# Patient Record
Sex: MALE | Race: OTHER | HISPANIC OR LATINO | Employment: OTHER | URBAN - METROPOLITAN AREA
[De-identification: names, ages, dates, MRNs, and addresses within clinical notes are randomized per-mention and may not be internally consistent; named-entity substitution may affect disease eponyms.]

---

## 2022-05-16 ENCOUNTER — OFFICE VISIT (OUTPATIENT)
Dept: UROLOGY | Facility: CLINIC | Age: 62
End: 2022-05-16
Payer: OTHER GOVERNMENT

## 2022-05-16 VITALS
HEART RATE: 82 BPM | DIASTOLIC BLOOD PRESSURE: 89 MMHG | WEIGHT: 204 LBS | TEMPERATURE: 98 F | BODY MASS INDEX: 29.2 KG/M2 | RESPIRATION RATE: 24 BRPM | HEIGHT: 70 IN | SYSTOLIC BLOOD PRESSURE: 129 MMHG

## 2022-05-16 DIAGNOSIS — R39.9 LOWER URINARY TRACT SYMPTOMS (LUTS): ICD-10-CM

## 2022-05-16 DIAGNOSIS — C61 PROSTATE CANCER: Primary | ICD-10-CM

## 2022-05-16 DIAGNOSIS — N52.9 ERECTILE DYSFUNCTION, UNSPECIFIED ERECTILE DYSFUNCTION TYPE: ICD-10-CM

## 2022-05-16 LAB
POC RESIDUAL URINE VOLUME: 0 ML (ref 0–100)
PSA, DIAGNOSTIC: 0.25 NG/ML (ref 0–4)

## 2022-05-16 PROCEDURE — 51798 POCT BLADDER SCAN: ICD-10-PCS | Mod: S$GLB,,, | Performed by: NURSE PRACTITIONER

## 2022-05-16 PROCEDURE — 99204 OFFICE O/P NEW MOD 45 MIN: CPT | Mod: S$GLB,,, | Performed by: NURSE PRACTITIONER

## 2022-05-16 PROCEDURE — 99204 PR OFFICE/OUTPT VISIT, NEW, LEVL IV, 45-59 MIN: ICD-10-PCS | Mod: S$GLB,,, | Performed by: NURSE PRACTITIONER

## 2022-05-16 PROCEDURE — 51798 US URINE CAPACITY MEASURE: CPT | Mod: S$GLB,,, | Performed by: NURSE PRACTITIONER

## 2022-05-16 RX ORDER — IPRATROPIUM BROMIDE AND ALBUTEROL SULFATE 2.5; .5 MG/3ML; MG/3ML
3 SOLUTION RESPIRATORY (INHALATION) EVERY 6 HOURS PRN
COMMUNITY

## 2022-05-16 RX ORDER — TRAZODONE HYDROCHLORIDE 150 MG/1
150 TABLET ORAL NIGHTLY
COMMUNITY

## 2022-05-16 RX ORDER — TAMSULOSIN HYDROCHLORIDE 0.4 MG/1
CAPSULE ORAL DAILY
COMMUNITY

## 2022-05-16 RX ORDER — AMLODIPINE BESYLATE 5 MG/1
5 TABLET ORAL DAILY
COMMUNITY

## 2022-05-16 RX ORDER — HYDROCHLOROTHIAZIDE 25 MG/1
25 TABLET ORAL DAILY
COMMUNITY

## 2022-05-16 RX ORDER — RISPERIDONE 4 MG/1
4 TABLET ORAL NIGHTLY
COMMUNITY

## 2022-05-16 RX ORDER — HYDROXYZINE HYDROCHLORIDE 50 MG/1
50 TABLET, FILM COATED ORAL 4 TIMES DAILY
COMMUNITY

## 2022-05-16 RX ORDER — OMEPRAZOLE 20 MG/1
20 CAPSULE, DELAYED RELEASE ORAL DAILY
COMMUNITY

## 2022-05-16 RX ORDER — OXYBUTYNIN CHLORIDE 15 MG/1
15 TABLET, EXTENDED RELEASE ORAL DAILY
Qty: 30 TABLET | Refills: 11 | Status: SHIPPED | OUTPATIENT
Start: 2022-05-16 | End: 2023-03-16 | Stop reason: SDUPTHER

## 2022-05-16 NOTE — PROGRESS NOTES
Subjective:       Patient ID: Mati Kang is a 61 y.o. male.    Chief Complaint: Dysuria, Urinary Frequency, Erectile Dysfunction, Urinary Urgency, and incontinence      HPI: 61-year-old male, new to [facility]  Patient has a history of prostate cancer.  Patient has had a prostatectomy.  Patient states this happened a long time ago.  He states too long ago to remember .  PSA from 10/21/2021 noted on his referral.  PSA at that time was 0.23.    Patient has a history of erectile dysfunction.  Patient complains of inability to get erections.  He has been on generic Viagra which is no longer working.    Patient is complaining of some urinary frequency.  Patient feels like he has to void, however he is unable to produce any urine or only produces a small amount of urine.  States this happens throughout the day and at night.     Patient is currently on Flomax 0.4 mg daily.    He denies any pain or burning urination.  Denies any odor urine.  Denies any fever.  Denies body aches.  Denies blood in urine.  Denies any significant weight loss.  Denies any new onset bone pain.  No other urinary complaints at this time.       Past Medical History:   Past Medical History:   Diagnosis Date    Allergy     Anxiety disorder, unspecified     Asthma     Bipolar disorder, unspecified     COPD (chronic obstructive pulmonary disease)     Hypertension     Mood changes     Prostate cancer        Past Surgical Historical:   Past Surgical History:   Procedure Laterality Date    HERNIA REPAIR      PROSTATE SURGERY          Medications:   Medication List with Changes/Refills   New Medications    OXYBUTYNIN (DITROPAN XL) 15 MG TR24    Take 1 tablet (15 mg total) by mouth once daily.   Current Medications    ALBUTEROL SULFATE 90 MCG/ACTUATION AEBS    Inhale 180 mcg into the lungs every 4 (four) hours.    ALBUTEROL-IPRATROPIUM (DUO-NEB) 2.5 MG-0.5 MG/3 ML NEBULIZER SOLUTION    Take 3 mLs by nebulization  every 6 (six) hours as needed for Wheezing. Rescue    AMLODIPINE (NORVASC) 5 MG TABLET    Take 5 mg by mouth once daily.    HYDROCHLOROTHIAZIDE (HYDRODIURIL) 25 MG TABLET    Take 25 mg by mouth once daily.    HYDROXYZINE (ATARAX) 50 MG TABLET    Take 50 mg by mouth 4 (four) times daily.    IPRATROPIUM-ALBUTEROL (COMBIVENT)  MCG/ACTUATION INHALER    Inhale 1 puff into the lungs 4 (four) times daily. Rescue    OMEPRAZOLE (PRILOSEC) 20 MG CAPSULE    Take 20 mg by mouth once daily.    RISPERIDONE (RISPERDAL) 4 MG TABLET    Take 4 mg by mouth nightly.    TAMSULOSIN (FLOMAX) 0.4 MG CAP    Take by mouth once daily.    TRAZODONE (DESYREL) 150 MG TABLET    Take 150 mg by mouth every evening.        Past Social History:   Social History     Socioeconomic History    Marital status: Unknown   Tobacco Use    Smoking status: Current Some Day Smoker     Types: Cigarettes    Smokeless tobacco: Never Used   Substance and Sexual Activity    Alcohol use: Not Currently    Drug use: Not Currently    Sexual activity: Not Currently     Partners: Female       Allergies: Review of patient's allergies indicates:  No Known Allergies     Family History: History reviewed. No pertinent family history.     Review of Systems:  Review of Systems   Constitutional: Negative for activity change and appetite change.   HENT: Negative for congestion and dental problem.    Eyes: Negative for visual disturbance.   Respiratory: Negative for chest tightness and shortness of breath.    Cardiovascular: Negative for chest pain.   Gastrointestinal: Negative for abdominal distention and abdominal pain.   Genitourinary: Positive for difficulty urinating and frequency. Negative for decreased urine volume, dysuria, enuresis, flank pain, genital sores, hematuria, penile discharge, penile pain, penile swelling, scrotal swelling, testicular pain and urgency.   Musculoskeletal: Negative for back pain and neck pain.   Skin: Negative for color change.    Neurological: Negative for dizziness.   Hematological: Negative for adenopathy.   Psychiatric/Behavioral: Negative for agitation, behavioral problems and confusion.       Physical Exam:  Physical Exam  Vitals and nursing note reviewed.   Constitutional:       Appearance: He is well-developed.   HENT:      Head: Normocephalic.   Eyes:      Pupils: Pupils are equal, round, and reactive to light.   Cardiovascular:      Rate and Rhythm: Normal rate and regular rhythm.      Heart sounds: Normal heart sounds.   Pulmonary:      Effort: Pulmonary effort is normal.      Breath sounds: Normal breath sounds.   Abdominal:      General: Bowel sounds are normal.      Palpations: Abdomen is soft.   Musculoskeletal:         General: Normal range of motion.      Cervical back: Normal range of motion and neck supple.   Skin:     General: Skin is warm and dry.   Neurological:      Mental Status: He is alert and oriented to person, place, and time.   Psychiatric:         Behavior: Behavior normal.       Bladder scan:  0 cc  Urinalysis:  Trace ketones, protein 30, otherwise normal.    Assessment/Plan:   1. Prostate cancer:  Check the patient's PSA.  We will notify him of the results.  Last PSA on record was on 10/21/2021 and was 0.23.    2. Erectile dysfunction:  Patient has failed Viagra.  Did discuss Cialis.  Patient interested in Cialis.  Patient states he would like to proceed with surgical penile prosthesis.  Will need to refer out.    3. Lower urinary tract symptoms:  Patient continue Flomax as directed.  Will start patient on oxybutynin XL 15 mg daily.  Will re-evaluate at next visit.    Plan follow-up in 2-3 months for re-evaluation, sooner if needed    Problem List Items Addressed This Visit    None     Visit Diagnoses     Prostate cancer    -  Primary    Relevant Orders    Prostate Specific Antigen, Diagnostic    Lower urinary tract symptoms (LUTS)        Relevant Medications    oxybutynin (DITROPAN XL) 15 MG TR24    Other  Relevant Orders    POCT Bladder Scan    Erectile dysfunction, unspecified erectile dysfunction type        Relevant Orders    Ambulatory referral/consult to Urology

## 2022-05-17 ENCOUNTER — TELEPHONE (OUTPATIENT)
Dept: UROLOGY | Facility: CLINIC | Age: 62
End: 2022-05-17
Payer: OTHER GOVERNMENT

## 2022-05-17 NOTE — TELEPHONE ENCOUNTER
Patient notified of PSA results and inquired about wanting surgery for Penile Protheisis. Explained that will [facility] and [facility]. MC LPN    ----- Message from Jose Mathis NP sent at 5/17/2022 10:14 AM CDT -----  PSA is 0.252.  [facility] was 0.[facility].    Will recheck at next visit.

## 2022-05-17 NOTE — TELEPHONE ENCOUNTER
VENU faxed to [facility] for referral authorization to Dr. Andrey Wolf.    Patient notified, stated he would like to see a urologist in [place]- patient advised to communicate that with the [facility] provider to see if another urologist in [place] accepts VA coverage/will perform procedure.

## 2022-06-03 ENCOUNTER — TELEPHONE (OUTPATIENT)
Dept: UROLOGY | Facility: CLINIC | Age: 62
End: 2022-06-03
Payer: OTHER GOVERNMENT

## 2022-06-03 NOTE — TELEPHONE ENCOUNTER
----- Message from Giuliana Moreno MA sent at 6/3/2022  2:32 PM CDT -----  Contact: Patient  Pt is wanting procedure done. Looks like tasha said to refer him out.  ----- Message -----  From: Yelena Brar  Sent: 6/3/2022   2:26 PM CDT  To: Darin Cunningham Staff    Patient need the nurse to call him. He need to schedule his procedure.  He has gotten [facility] to [facility] in [place]        Call back #  [phone]

## 2022-06-03 NOTE — TELEPHONE ENCOUNTER
Spoke with pt about his [facility] approval for his surgery. Pt states it was approved to be done here not [place]. I tried to explain to pt I do not know if other urologist in [place] will do this sx. Pt states he will just contact [facility] to find him another urologist.

## 2022-06-17 ENCOUNTER — TELEPHONE (OUTPATIENT)
Dept: UROLOGY | Facility: CLINIC | Age: 62
End: 2022-06-17
Payer: OTHER GOVERNMENT

## 2022-06-17 DIAGNOSIS — N52.9 ERECTILE DYSFUNCTION, UNSPECIFIED ERECTILE DYSFUNCTION TYPE: Primary | ICD-10-CM

## 2022-06-17 RX ORDER — SILDENAFIL 100 MG/1
100 TABLET, FILM COATED ORAL DAILY PRN
Qty: 5 TABLET | Refills: 5 | Status: SHIPPED | OUTPATIENT
Start: 2022-06-17 | End: 2022-11-21 | Stop reason: SDUPTHER

## 2022-06-17 NOTE — TELEPHONE ENCOUNTER
Per note from visit, Viagra did not work for the patient.  We did discuss Cialis.  However, patient wanted to proceed with a penile prosthesis.  He was to be referred out very penile prosthesis.

## 2022-06-17 NOTE — TELEPHONE ENCOUNTER
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 He stated he called the [facility] and they stated [facility] hasn't assigned him a new Urologist yet. Please advise. MC LPN

## 2022-06-17 NOTE — TELEPHONE ENCOUNTER
----- Message from Olivia Palmer sent at 6/17/2022  8:41 AM CDT -----  Contact: Patient  Type:  RX Refill Request    Who Called: Mati Kang  Refill or New Rx:refill  RX Name and Strength:Viagra(didn't state the mg)  How is the patient currently taking it? (ex. 1XDay):as needed  Is this a 30 day or 90 day RX:30  Preferred Pharmacy with phone number:  [facility] - [address] [address]  Phone: [phone] Fax: [phone]  Local or Mail Order:local  Ordering Provider:Jose Mathis  Would the patient rather a call back or a response via [facility]? Call back   Best Call Back Number:[phone]  Additional Information:

## 2022-08-16 ENCOUNTER — CLINICAL SUPPORT (OUTPATIENT)
Dept: UROLOGY | Facility: CLINIC | Age: 62
End: 2022-08-16
Payer: OTHER GOVERNMENT

## 2022-08-16 VITALS
HEART RATE: 73 BPM | BODY MASS INDEX: 30.21 KG/M2 | DIASTOLIC BLOOD PRESSURE: 94 MMHG | WEIGHT: 204 LBS | SYSTOLIC BLOOD PRESSURE: 166 MMHG | HEIGHT: 69 IN

## 2022-08-16 DIAGNOSIS — R39.9 LOWER URINARY TRACT SYMPTOMS (LUTS): ICD-10-CM

## 2022-08-16 DIAGNOSIS — C61 PROSTATE CANCER: Primary | ICD-10-CM

## 2022-08-16 LAB — POC RESIDUAL URINE VOLUME: 0 ML (ref 0–100)

## 2022-08-16 PROCEDURE — 99499 POCT BLADDER SCAN: ICD-10-PCS | Mod: S$GLB,,, | Performed by: NURSE PRACTITIONER

## 2022-08-16 PROCEDURE — 99499 UNLISTED E&M SERVICE: CPT | Mod: S$GLB,,, | Performed by: NURSE PRACTITIONER

## 2022-11-21 ENCOUNTER — TELEPHONE (OUTPATIENT)
Dept: UROLOGY | Facility: CLINIC | Age: 62
End: 2022-11-21
Payer: OTHER GOVERNMENT

## 2022-11-21 DIAGNOSIS — N52.9 ERECTILE DYSFUNCTION, UNSPECIFIED ERECTILE DYSFUNCTION TYPE: ICD-10-CM

## 2022-11-21 RX ORDER — SILDENAFIL 100 MG/1
100 TABLET, FILM COATED ORAL DAILY PRN
Qty: 5 TABLET | Refills: 0 | Status: SHIPPED | OUTPATIENT
Start: 2022-11-21 | End: 2023-03-16 | Stop reason: SDUPTHER

## 2022-11-21 NOTE — TELEPHONE ENCOUNTER
Spoke with pt about medication and apts. Pt stated he just needs his viagra filled and he will then make another apt once he receives medication. Notified pt a message would have to be sent to provider to be approved before medication gets sent out. ED

## 2022-11-21 NOTE — TELEPHONE ENCOUNTER
Notified pt that medication was sent out and that he needed to make a apt in order to get more refills. Pt verbalized understanding. ED

## 2022-11-21 NOTE — TELEPHONE ENCOUNTER
----- Message from Kisha Whaley LPN sent at 11/21/2022  2:33 PM CST -----  Contact: pt    ----- Message -----  From: Mayra Arevalo  Sent: 11/21/2022   2:02 PM CST  To: Del Garcia Staff    Pt calling to check status of script for sildenafiL (VIAGRA) 100 MG and status of surgery.  Pt can be reached at [phone]    Thanks,

## 2022-11-21 NOTE — TELEPHONE ENCOUNTER
----- Message from Katlin Irving sent at 11/21/2022  8:55 AM CST -----  Contact: pt  .Type:  RX Refill Request    Who Called: selena  Refill or New Rx:refill  RX Name and Strength:sildenafiL (VIAGRA) 100 MG tablet  How is the patient currently taking it? (ex. 1XDay):  Is this a 30 day or 90 day RX:  Preferred Pharmacy with phone number:[facility] - [address] [address]  Phone: [phone] Fax: [phone]      Local or Mail Order:local  Ordering Provider:amado  Would the patient rather a call back or a response via [facility]? rosa  Best Call Back Number:.[phone]    Additional Information: pt also waiting on information on surgery

## 2023-01-31 ENCOUNTER — TELEPHONE (OUTPATIENT)
Dept: UROLOGY | Facility: CLINIC | Age: 63
End: 2023-01-31

## 2023-01-31 NOTE — TELEPHONE ENCOUNTER
----- Message from Olivia Palmer sent at 1/31/2023  1:13 PM CST -----  Contact: Patient  Patient called to consult with nurse or staff regarding his appointment on today. He would like to know if he can come in earlier if possible. If not, than he will be at the appointment at 4. He can be reached at [phone]. Thanks/

## 2023-03-16 ENCOUNTER — OFFICE VISIT (OUTPATIENT)
Dept: UROLOGY | Facility: CLINIC | Age: 63
End: 2023-03-16
Payer: OTHER GOVERNMENT

## 2023-03-16 VITALS — WEIGHT: 210 LBS | BODY MASS INDEX: 31.1 KG/M2 | HEIGHT: 69 IN

## 2023-03-16 DIAGNOSIS — R39.9 LOWER URINARY TRACT SYMPTOMS (LUTS): ICD-10-CM

## 2023-03-16 DIAGNOSIS — N52.9 ERECTILE DYSFUNCTION, UNSPECIFIED ERECTILE DYSFUNCTION TYPE: ICD-10-CM

## 2023-03-16 DIAGNOSIS — C61 PROSTATE CANCER: Primary | ICD-10-CM

## 2023-03-16 PROCEDURE — 99214 OFFICE O/P EST MOD 30 MIN: CPT | Mod: S$GLB,,, | Performed by: NURSE PRACTITIONER

## 2023-03-16 PROCEDURE — 99214 PR OFFICE/OUTPT VISIT, EST, LEVL IV, 30-39 MIN: ICD-10-PCS | Mod: S$GLB,,, | Performed by: NURSE PRACTITIONER

## 2023-03-16 RX ORDER — SILDENAFIL 100 MG/1
100 TABLET, FILM COATED ORAL DAILY PRN
Qty: 21 TABLET | Refills: 3 | Status: SHIPPED | OUTPATIENT
Start: 2023-03-16 | End: 2024-03-15

## 2023-03-16 RX ORDER — LATANOPROST 50 UG/ML
SOLUTION/ DROPS OPHTHALMIC
COMMUNITY
Start: 2023-03-07

## 2023-03-16 RX ORDER — IPRATROPIUM BROMIDE 0.5 MG/2.5ML
SOLUTION RESPIRATORY (INHALATION)
COMMUNITY
Start: 2023-02-27

## 2023-03-16 RX ORDER — CARBAMAZEPINE 400 MG/1
800 TABLET, EXTENDED RELEASE ORAL
COMMUNITY
Start: 2022-07-29

## 2023-03-16 RX ORDER — CLONIDINE HYDROCHLORIDE 0.3 MG/1
0.3 TABLET ORAL
COMMUNITY
Start: 2022-12-06

## 2023-03-16 RX ORDER — OXYBUTYNIN CHLORIDE 15 MG/1
15 TABLET, EXTENDED RELEASE ORAL DAILY
Qty: 30 TABLET | Refills: 11 | Status: SHIPPED | OUTPATIENT
Start: 2023-03-16 | End: 2024-03-15

## 2023-03-16 RX ORDER — LANOLIN ALCOHOL/MO/W.PET/CERES
1000 CREAM (GRAM) TOPICAL
COMMUNITY
Start: 2022-12-06

## 2023-03-16 NOTE — PROGRESS NOTES
Subjective:       Patient ID: Mati Kang is a 62 y.o. male.    Chief Complaint: Prostate Cancer      HPI: 62-year-old male, established patient, last seen May 2022.    Patient has history of prostate cancer.  He would a radical prostatectomy.    Patient [facility] in [place] [facility]  He does not recall when.      Patient is on oxybutynin XL 50 mg daily for frequency and urgency.    Patient states he has been taking inconsistently lately.  But he is requesting refill.      Patient has history of erectile dysfunction.  Maintained on sildenafil 100 mg as needed.    Patient states working well no complaints or complications.  Request refill.      Denies any pain or burning urination.  Denies any odor urine.  Denies any fever body aches.  Denies any blood in urine.  Denies any significant weight loss.  Denies any onset bone pain.    No other urinary complaints at this time.       Past Medical History:   Past Medical History:   Diagnosis Date    ADD (attention deficit disorder)     Allergy     Anxiety disorder, unspecified     Asthma     Bi inguinal hernia, w obst, w/o gangrene, not spcf as recur     Bipolar disorder, unspecified     COPD (chronic obstructive pulmonary disease)     GERD (gastroesophageal reflux disease)     Hypertension     Insomnia     Low back pain     Male erectile dysfunction, unspecified     Mixed hyperlipidemia     Mood changes     Obesity, unspecified     Prostate cancer     Prurigo nodularis     Shortness of breath     Unspecified glaucoma        Past Surgical Historical:   Past Surgical History:   Procedure Laterality Date    HAND SURGERY Right     fractured fingers    HERNIA REPAIR      PROSTATE SURGERY          Medications:   Medication List with Changes/Refills   Current Medications    ALBUTEROL SULFATE 90 MCG/ACTUATION AEBS    Inhale 180 mcg into the lungs every 4 (four) hours.    ALBUTEROL-IPRATROPIUM (DUO-NEB) 2.5 MG-0.5 MG/3 ML NEBULIZER SOLUTION    Take  3 mLs by nebulization every 6 (six) hours as needed for Wheezing. Rescue    AMLODIPINE (NORVASC) 5 MG TABLET    Take 5 mg by mouth once daily.    CARBAMAZEPINE (TEGRETOL XR) 400 MG TB12    800 mg.    CLONIDINE (CATAPRES) 0.3 MG TABLET    0.3 mg.    CYANOCOBALAMIN (VITAMIN B-12) 1000 MCG TABLET    1,000 mcg.    HYDROCHLOROTHIAZIDE (HYDRODIURIL) 25 MG TABLET    Take 25 mg by mouth once daily.    HYDROXYZINE (ATARAX) 50 MG TABLET    Take 50 mg by mouth 4 (four) times daily.    IPRATROPIUM (ATROVENT) 0.02 % NEBULIZER SOLUTION    INHALE 2.5 MLS INHALE FOUR TIMES A DAY FOR ASTHMA VIA NEBULIZER (OR VIA NEBULIZING MACHINE)    IPRATROPIUM-ALBUTEROL (COMBIVENT)  MCG/ACTUATION INHALER    Inhale 1 puff into the lungs 4 (four) times daily. Rescue    LATANOPROST 0.005 % OPHTHALMIC SOLUTION    INSTILL 1 DROP BOTH EYES AT BEDTIME (STORE PRODUCT IN THE REFRIGERATOR)    OMEPRAZOLE (PRILOSEC) 20 MG CAPSULE    Take 20 mg by mouth once daily.    RISPERIDONE (RISPERDAL) 4 MG TABLET    Take 4 mg by mouth nightly.    TAMSULOSIN (FLOMAX) 0.4 MG CAP    Take by mouth once daily.    TRAZODONE (DESYREL) 150 MG TABLET    Take 150 mg by mouth every evening.   Changed and/or Refilled Medications    Modified Medication Previous Medication    OXYBUTYNIN (DITROPAN XL) 15 MG TR24 oxybutynin (DITROPAN XL) 15 MG TR24       Take 1 tablet (15 mg total) by mouth once daily.    Take 1 tablet (15 mg total) by mouth once daily.    SILDENAFIL (VIAGRA) 100 MG TABLET sildenafiL (VIAGRA) 100 MG tablet       Take 1 tablet (100 mg total) by mouth daily as needed for Erectile Dysfunction.    Take 1 tablet (100 mg total) by mouth daily as needed for Erectile Dysfunction.        Past Social History:   Social History     Socioeconomic History    Marital status: Unknown   Tobacco Use    Smoking status: Former     Types: Cigarettes    Smokeless tobacco: Never   Substance and Sexual Activity    Alcohol use: Not Currently    Drug use: Not Currently    Sexual  activity: Not Currently     Partners: Female       Allergies: Review of patient's allergies indicates:  No Known Allergies     Family History: No family history on file.     Review of Systems:  Review of Systems   Constitutional:  Negative for activity change and appetite change.   HENT:  Negative for congestion and dental problem.    Eyes:  Negative for visual disturbance.   Respiratory:  Negative for chest tightness and shortness of breath.    Cardiovascular:  Negative for chest pain.   Gastrointestinal:  Negative for abdominal distention and abdominal pain.   Genitourinary:  Positive for frequency and urgency. Negative for decreased urine volume, difficulty urinating, dysuria, enuresis, flank pain, genital sores, hematuria, penile discharge, penile pain, penile swelling, scrotal swelling and testicular pain.   Musculoskeletal:  Negative for back pain and neck pain.   Skin:  Negative for color change.   Neurological:  Negative for dizziness.   Hematological:  Negative for adenopathy.   Psychiatric/Behavioral:  Negative for agitation, behavioral problems and confusion.      Physical Exam:  Physical Exam  Vitals and nursing note reviewed.   Constitutional:       Appearance: He is well-developed.   HENT:      Head: Normocephalic.   Eyes:      Pupils: Pupils are equal, round, and reactive to light.   Cardiovascular:      Rate and Rhythm: Normal rate and regular rhythm.      Heart sounds: Normal heart sounds.   Pulmonary:      Effort: Pulmonary effort is normal.      Breath sounds: Normal breath sounds.   Abdominal:      General: Bowel sounds are normal.      Palpations: Abdomen is soft.   Musculoskeletal:         General: Normal range of motion.      Cervical back: Normal range of motion and neck supple.   Skin:     General: Skin is warm and dry.   Neurological:      Mental Status: He is alert and oriented to person, place, and time.   Psychiatric:         Behavior: Behavior normal.     Urinalysis: Protein 30,  otherwise normal.    Assessment/Plan:   1. Prostate cancer:  Check the patient's PSA.  We will notify him of the results.      2. Lower urinary tract symptoms:  Will refill patient's oxybutynin XL 15 mg daily.    Patient encouraged to take medication daily as directed.      3. Erectile dysfunction:  Patient is requesting refill of sildenafil 100 mg as needed.    [facility]      Follow-up 6 months, sooner if needed.    Problem List Items Addressed This Visit    None  Visit Diagnoses       Prostate cancer    -  Primary    Relevant Orders    Prostate Specific Antigen, Diagnostic    Lower urinary tract symptoms (LUTS)        Relevant Medications    oxybutynin (DITROPAN XL) 15 MG TR24    Other Relevant Orders    POCT Urinalysis (w/Micro Option)    Erectile dysfunction, unspecified erectile dysfunction type        Relevant Medications    sildenafiL (VIAGRA) 100 MG tablet

## 2023-03-17 LAB — PSA, DIAGNOSTIC: 0.36 NG/ML (ref 0–4)

## 2023-03-20 ENCOUNTER — TELEPHONE (OUTPATIENT)
Dept: UROLOGY | Facility: CLINIC | Age: 63
End: 2023-03-20
Payer: OTHER GOVERNMENT

## 2023-03-21 ENCOUNTER — TELEPHONE (OUTPATIENT)
Dept: UROLOGY | Facility: CLINIC | Age: 63
End: 2023-03-21
Payer: OTHER GOVERNMENT

## 2023-09-18 ENCOUNTER — TELEPHONE (OUTPATIENT)
Dept: UROLOGY | Facility: CLINIC | Age: 63
End: 2023-09-18

## 2023-09-18 NOTE — TELEPHONE ENCOUNTER
----- Message from Mayra Arevalo sent at 9/18/2023  8:13 AM CDT -----  Contact: pt  Pt calling stating he is in hospital and needs to reschedule appt and she can be reached at [phone].    Thanks,

## 2023-10-04 ENCOUNTER — OFFICE VISIT (OUTPATIENT)
Dept: UROLOGY | Facility: CLINIC | Age: 63
End: 2023-10-04
Payer: OTHER GOVERNMENT

## 2023-10-04 VITALS — BODY MASS INDEX: 31.1 KG/M2 | HEIGHT: 69 IN | WEIGHT: 210 LBS

## 2023-10-04 DIAGNOSIS — C61 PROSTATE CANCER: Primary | ICD-10-CM

## 2023-10-04 DIAGNOSIS — R35.0 URINARY FREQUENCY: ICD-10-CM

## 2023-10-04 DIAGNOSIS — R39.15 URINARY URGENCY: ICD-10-CM

## 2023-10-04 DIAGNOSIS — R39.9 LOWER URINARY TRACT SYMPTOMS (LUTS): ICD-10-CM

## 2023-10-04 LAB — PSA, DIAGNOSTIC: 0.49 NG/ML (ref 0.1–4)

## 2023-10-04 PROCEDURE — 99214 PR OFFICE/OUTPT VISIT, EST, LEVL IV, 30-39 MIN: ICD-10-PCS | Mod: S$GLB,,, | Performed by: NURSE PRACTITIONER

## 2023-10-04 PROCEDURE — 99214 OFFICE O/P EST MOD 30 MIN: CPT | Mod: S$GLB,,, | Performed by: NURSE PRACTITIONER

## 2023-10-04 RX ORDER — MIRABEGRON 50 MG/1
50 TABLET, FILM COATED, EXTENDED RELEASE ORAL DAILY
Qty: 90 TABLET | Refills: 3 | Status: SHIPPED | OUTPATIENT
Start: 2023-10-04 | End: 2024-10-03

## 2023-10-04 NOTE — PROGRESS NOTES
"Subjective:       Patient ID: Mati Kang is a 63 y.o. male.    Chief Complaint: Urinary Frequency      HPI: 63-year-old male, established patient, last seen March 2023.    Patient has history of prostate cancer.  He would a radical prostatectomy.    Patient does not recall when he had his radical prostatectomy.  States was "years ago".    Last PSA in March was 0.36.  In May of 2020 PSA was 0.252.  Patient has history of LUTS.  Records show he is on Flomax 0.4 mg daily.    In 2002 due to we started the patient on oxybutynin XL 15 mg daily for frequency and urgency.  Patient was initially doing well.  However, he presents today stating he started have worsening symptoms.  Patient states he will have the need to go to the bathroom and then will have difficulty voiding.  States he started to have more episodes of frequency and urgency.    Denies any pain or burning urination.  Denies any odor the urine.  Denies any fever body aches.  Denies any blood in urine.  Denies any unexpected weight loss.  No other urinary complaints this time.         Past Medical History:   Past Medical History:   Diagnosis Date    ADD (attention deficit disorder)     Allergy     Anxiety disorder, unspecified     Asthma     Bi inguinal hernia, w obst, w/o gangrene, not spcf as recur     Bipolar disorder, unspecified     COPD (chronic obstructive pulmonary disease)     GERD (gastroesophageal reflux disease)     Hypertension     Insomnia     Low back pain     Male erectile dysfunction, unspecified     Mixed hyperlipidemia     Mood changes     Obesity, unspecified     Prostate cancer     Prurigo nodularis     Shortness of breath     Unspecified glaucoma        Past Surgical Historical:   Past Surgical History:   Procedure Laterality Date    HAND SURGERY Right     fractured fingers    HERNIA REPAIR      PROSTATE SURGERY          Medications:   Medication List with Changes/Refills   New Medications    MIRABEGRON (MYRBETRIQ) 50 MG TB24    Take 1 " tablet (50 mg total) by mouth once daily.   Current Medications    ALBUTEROL SULFATE 90 MCG/ACTUATION AEBS    Inhale 180 mcg into the lungs every 4 (four) hours.    ALBUTEROL-IPRATROPIUM (DUO-NEB) 2.5 MG-0.5 MG/3 ML NEBULIZER SOLUTION    Take 3 mLs by nebulization every 6 (six) hours as needed for Wheezing. Rescue    AMLODIPINE (NORVASC) 5 MG TABLET    Take 5 mg by mouth once daily.    CARBAMAZEPINE (TEGRETOL XR) 400 MG TB12    800 mg.    CLONIDINE (CATAPRES) 0.3 MG TABLET    0.3 mg.    CYANOCOBALAMIN (VITAMIN B-12) 1000 MCG TABLET    1,000 mcg.    HYDROCHLOROTHIAZIDE (HYDRODIURIL) 25 MG TABLET    Take 25 mg by mouth once daily.    HYDROXYZINE (ATARAX) 50 MG TABLET    Take 50 mg by mouth 4 (four) times daily.    IPRATROPIUM (ATROVENT) 0.02 % NEBULIZER SOLUTION    INHALE 2.5 MLS INHALE FOUR TIMES A DAY FOR ASTHMA VIA NEBULIZER (OR VIA NEBULIZING MACHINE)    IPRATROPIUM-ALBUTEROL (COMBIVENT)  MCG/ACTUATION INHALER    Inhale 1 puff into the lungs 4 (four) times daily. Rescue    LATANOPROST 0.005 % OPHTHALMIC SOLUTION    INSTILL 1 DROP BOTH EYES AT BEDTIME (STORE PRODUCT IN THE REFRIGERATOR)    OMEPRAZOLE (PRILOSEC) 20 MG CAPSULE    Take 20 mg by mouth once daily.    OXYBUTYNIN (DITROPAN XL) 15 MG TR24    Take 1 tablet (15 mg total) by mouth once daily.    RISPERIDONE (RISPERDAL) 4 MG TABLET    Take 4 mg by mouth nightly.    SILDENAFIL (VIAGRA) 100 MG TABLET    Take 1 tablet (100 mg total) by mouth daily as needed for Erectile Dysfunction.    TAMSULOSIN (FLOMAX) 0.4 MG CAP    Take by mouth once daily.    TRAZODONE (DESYREL) 150 MG TABLET    Take 150 mg by mouth every evening.        Past Social History:   Social History     Socioeconomic History    Marital status: Unknown   Tobacco Use    Smoking status: Some Days     Types: Cigarettes    Smokeless tobacco: Never   Substance and Sexual Activity    Alcohol use: Not Currently    Drug use: Not Currently    Sexual activity: Not Currently     Partners: Female        Allergies: Review of patient's allergies indicates:  No Known Allergies     Family History: History reviewed. No pertinent family history.     Review of Systems:  Review of Systems   Constitutional:  Negative for activity change and appetite change.   HENT:  Negative for congestion and dental problem.    Eyes:  Negative for visual disturbance.   Respiratory:  Negative for chest tightness and shortness of breath.    Cardiovascular:  Negative for chest pain.   Gastrointestinal:  Negative for abdominal distention and abdominal pain.   Genitourinary:  Positive for difficulty urinating, frequency and urgency. Negative for decreased urine volume, dysuria, enuresis, flank pain, genital sores, hematuria, penile discharge, penile pain, penile swelling, scrotal swelling and testicular pain.   Musculoskeletal:  Negative for back pain and neck pain.   Skin:  Negative for color change.   Neurological:  Negative for dizziness.   Hematological:  Negative for adenopathy.   Psychiatric/Behavioral:  Negative for agitation, behavioral problems and confusion.        Physical Exam:  Physical Exam  Vitals and nursing note reviewed.   Constitutional:       Appearance: He is well-developed.   HENT:      Head: Normocephalic.   Eyes:      Pupils: Pupils are equal, round, and reactive to light.   Cardiovascular:      Rate and Rhythm: Normal rate and regular rhythm.      Heart sounds: Normal heart sounds.   Pulmonary:      Effort: Pulmonary effort is normal.      Breath sounds: Normal breath sounds.   Abdominal:      General: Bowel sounds are normal.      Palpations: Abdomen is soft.   Musculoskeletal:         General: Normal range of motion.      Cervical back: Normal range of motion and neck supple.   Skin:     General: Skin is warm and dry.   Neurological:      Mental Status: He is alert and oriented to person, place, and time.   Psychiatric:         Behavior: Behavior normal.       Bladder scan:  54 cc    Assessment/Plan:   1.  Prostate cancer: Will check the patient's PSA.  We will notify him of the results.      2. LUTS/frequency/urgency:  Patient is no longer having relief with oxybutynin XL 15 mg daily.    Will try Myrbetriq 50 mg daily.  [facility]    Patient provided 2 week samples.      Plan follow-up in 6 months, sooner if needed.  Problem List Items Addressed This Visit    None  Visit Diagnoses       Prostate cancer    -  Primary    Relevant Orders    Prostate Specific Antigen, Diagnostic    Lower urinary tract symptoms (LUTS)        Relevant Medications    mirabegron (MYRBETRIQ) 50 mg Tb24    Other Relevant Orders    POCT Bladder Scan    Urinary frequency        Relevant Medications    mirabegron (MYRBETRIQ) 50 mg Tb24    Other Relevant Orders    POCT Bladder Scan    Urinary urgency        Relevant Medications    mirabegron (MYRBETRIQ) 50 mg Tb24    Other Relevant Orders    POCT Bladder Scan

## 2023-10-12 DIAGNOSIS — C61 PROSTATE CANCER: Primary | ICD-10-CM

## 2023-10-24 ENCOUNTER — TELEPHONE (OUTPATIENT)
Dept: UROLOGY | Facility: CLINIC | Age: 63
End: 2023-10-24
Payer: OTHER GOVERNMENT

## 2023-10-24 NOTE — TELEPHONE ENCOUNTER
Pt stated that he believes [facility] called [facility] in regards to his referral but did not leave call back or Vm. I informed him that I sent them a fax on today for inquiry of status of referral and also sent the pharmacy information regarding his medication that he has not been able to get.

## 2023-10-24 NOTE — TELEPHONE ENCOUNTER
----- Message from Anabel Anderson sent at 10/24/2023  2:21 PM CDT -----  Regarding: Referral  Contact: patient  Per phone call with patient, he stated that he was supposed to be referred to [facility],Radiation Oncology and he has not received a response regarding this.  Also the caller indicated that the pharmacy does not have any information for him regarding his medication mirabegron (MYRBETRIQ) 50 mg Tb24.  Please return call at [phone].    Thanks,  SJ

## 2023-10-24 NOTE — TELEPHONE ENCOUNTER
----- Message from Olivia Palmer sent at 10/24/2023  2:34 PM CDT -----  Contact: Patient  Patient called to consult with nurse or staff regarding his medication. He states he has a few questions about the medication and wanted to speak with clinic if possible. He can be reached at [phone]. Thanks/

## 2023-12-06 ENCOUNTER — TELEPHONE (OUTPATIENT)
Dept: UROLOGY | Facility: CLINIC | Age: 63
End: 2023-12-06
Payer: OTHER GOVERNMENT

## 2023-12-06 NOTE — TELEPHONE ENCOUNTER
----- Message from Phuong Hernandez sent at 12/6/2023  1:27 PM CST -----  Contact: [facility]ett[facility]  Type:  Pharmacy Calling Regarding Authorization    Name of Caller:[facility]  Prescription Name:candyabegron (MYRBETRIQ)  Best Call Back Number:[phone]  Additional Information: Pharmacy called stating that they are needing paperwork filled out and faxed back in order to fill the patient prescription. The fax number is [phone] or [phone].

## 2024-05-03 DIAGNOSIS — J44.9 CHRONIC OBSTRUCTIVE PULMONARY DISEASE, UNSPECIFIED COPD TYPE: Primary | ICD-10-CM

## 2024-07-10 ENCOUNTER — HOSPITAL ENCOUNTER (OUTPATIENT)
Dept: RADIOLOGY | Facility: CLINIC | Age: 64
Discharge: HOME | End: 2024-07-10
Attending: INTERNAL MEDICINE
Payer: OTHER GOVERNMENT

## 2024-07-10 ENCOUNTER — OUTSIDE PLACE OF SERVICE (OUTPATIENT)
Dept: PULMONOLOGY | Facility: CLINIC | Age: 64
End: 2024-07-10

## 2024-07-10 ENCOUNTER — OFFICE VISIT (OUTPATIENT)
Dept: PULMONOLOGY | Facility: CLINIC | Age: 64
End: 2024-07-10
Payer: OTHER GOVERNMENT

## 2024-07-10 VITALS
HEART RATE: 74 BPM | BODY MASS INDEX: 29.77 KG/M2 | WEIGHT: 201 LBS | DIASTOLIC BLOOD PRESSURE: 75 MMHG | SYSTOLIC BLOOD PRESSURE: 130 MMHG | HEIGHT: 69 IN | RESPIRATION RATE: 20 BRPM | OXYGEN SATURATION: 90 %

## 2024-07-10 DIAGNOSIS — J44.9 CHRONIC OBSTRUCTIVE PULMONARY DISEASE, UNSPECIFIED COPD TYPE: Primary | ICD-10-CM

## 2024-07-10 DIAGNOSIS — J44.9 END STAGE COPD: Primary | ICD-10-CM

## 2024-07-10 DIAGNOSIS — J44.9 CHRONIC OBSTRUCTIVE PULMONARY DISEASE, UNSPECIFIED COPD TYPE: ICD-10-CM

## 2024-07-10 DIAGNOSIS — Z87.891 HISTORY OF TOBACCO ABUSE: ICD-10-CM

## 2024-07-10 PROCEDURE — 99205 OFFICE O/P NEW HI 60 MIN: CPT | Mod: 25,S$GLB,, | Performed by: INTERNAL MEDICINE

## 2024-07-10 PROCEDURE — 94060 EVALUATION OF WHEEZING: CPT | Mod: 26,,, | Performed by: INTERNAL MEDICINE

## 2024-07-10 PROCEDURE — 71046 X-RAY EXAM CHEST 2 VIEWS: CPT | Mod: TC,,, | Performed by: INTERNAL MEDICINE

## 2024-07-10 PROCEDURE — 94726 PLETHYSMOGRAPHY LUNG VOLUMES: CPT | Mod: 26,,, | Performed by: INTERNAL MEDICINE

## 2024-07-10 PROCEDURE — 94729 DIFFUSING CAPACITY: CPT | Mod: 26,,, | Performed by: INTERNAL MEDICINE

## 2024-07-10 PROCEDURE — 71046 X-RAY EXAM CHEST 2 VIEWS: CPT | Mod: 26,,, | Performed by: RADIOLOGY

## 2024-07-10 RX ORDER — BUDESONIDE, GLYCOPYRROLATE, AND FORMOTEROL FUMARATE 160; 9; 4.8 UG/1; UG/1; UG/1
2 AEROSOL, METERED RESPIRATORY (INHALATION) 2 TIMES DAILY
Qty: 0.0019 G | Refills: 5 | Status: SHIPPED | OUTPATIENT
Start: 2024-07-10

## 2024-07-10 NOTE — PROGRESS NOTES
Subjective:    Patient Identification:   Patient ID: Mati Kang is a 63 y.o. male.    [facility]    Chief Complaint:    [facility]    History of Present Illness:    Mati Kang is a 63 y.o. male who presents  with a very aggressive demeanor.  Patient was scheduled for a PFT at [facility] and reportedly was abusive towards staff.  He then walked out of the hospital and demanded to be seen in clinic.  He was convinced to go back to the hospital and get his PFTs done.  He tried to be verbally abusive towards my clinic staff.  After completing my interview evaluation for supplemental oxygen was recommended which he declined and walked out of the clinic.    Patient was very fidgety bile I entered the room.  He mentioned that he quit smoking more than 15 years ago.  He smoked on and off throughout his life prior to that.  He complains of significant exertional dyspnea which is also present with his ADLs.  He does complain of getting short of breath sometimes taking a shower.  He does have cough which is mostly dry.  He does have very frequent wheezing episodes.  He mentions that he has never seen a pulmonologist before.  He mentions that he does have a nebulizer but it does not seem to work.  He mentions that he has not on any maintenance inhalers as they do not work for him.  He has never been evaluated for supplemental oxygen but was recently told that he may need to be on oxygen.  He mentions that he is being worked up for sleep apnea but does not know the status on his sleep study schedule yet.    His chest x-ray on my review shows significant emphysematous changes specially in upper lobes without any acute findings.  His PFTs are consistent with advanced or end-stage COPD with severe air trapping and severely reduced diffusion capacity.  A bronchodilator response was not present.    Review of Systems:  Review of Systems   Constitutional:  Negative for fever, chills, weight loss,  weight gain, activity change, appetite change, fatigue, night sweats and weakness.   HENT:  Negative for nosebleeds, postnasal drip, rhinorrhea, sinus pressure, sore throat, trouble swallowing, voice change, congestion, ear pain and hearing loss.    Eyes:  Negative for redness and itching.   Respiratory:  Positive for cough, wheezing and dyspnea on extertion. Negative for hemoptysis, sputum production, choking, chest tightness, shortness of breath, orthopnea, previous hospitialization due to pulmonary problems, asthma nighttime symptoms, pleurisy, use of rescue inhaler and Paroxysmal Nocturnal Dyspnea.    Cardiovascular:  Negative for chest pain, palpitations and leg swelling.   Genitourinary:  Negative for difficulty urinating and hematuria.   Endocrine:  Negative for polydipsia, polyphagia, cold intolerance, heat intolerance and polyuria.    Musculoskeletal:  Negative for arthralgias, back pain, gait problem, joint swelling and myalgias.   Skin:  Negative for rash.   Gastrointestinal:  Negative for nausea, vomiting, abdominal pain, abdominal distention and acid reflux.   Neurological:  Negative for dizziness, syncope, weakness, light-headedness and headaches.   Hematological:  Negative for adenopathy. Does not bruise/bleed easily and no excessive bruising.   Psychiatric/Behavioral:  Negative for confusion and sleep disturbance. The patient is nervous/anxious.           Verbally abusive towards hospital and clinic staff.  We will easily get offended to routine questions.         Allergies: Review of patient's allergies indicates:  No Known Allergies    Medications:      Past Medical History:      Past Medical History:   Diagnosis Date    ADD (attention deficit disorder)     Allergy     Anxiety disorder, unspecified     Asthma     Bi inguinal hernia, w obst, w/o gangrene, not spcf as recur     Bipolar disorder, unspecified     COPD (chronic obstructive pulmonary disease)     GERD (gastroesophageal reflux disease)      Hypertension     Insomnia     Low back pain     Male erectile dysfunction, unspecified     Mixed hyperlipidemia     Mood changes     Obesity, unspecified     Prostate cancer     Prurigo nodularis     Shortness of breath     Unspecified glaucoma        Family History:      No family history on file.     Social History:      Past Surgical History:   Procedure Laterality Date    HAND SURGERY Right     fractured fingers    HERNIA REPAIR      PROSTATE SURGERY         Physical Exam:  Vitals:    07/10/24 1045   BP: 130/75   Pulse: 74   Resp: 20     Physical Exam   Constitutional: He is oriented to person, place, and time. He appears not cachectic. No distress.   HENT:   Head: Normocephalic.   Right Ear: External ear normal.   Left Ear: External ear normal.   Nose: Nose normal. No mucosal edema. No polyps.   Mouth/Throat: Oropharynx is clear and moist. Normal dentition. No oropharyngeal exudate.   Neck: No JVD present. No tracheal deviation present. No thyromegaly present.   Cardiovascular: Normal rate, regular rhythm, normal heart sounds and intact distal pulses. Exam reveals no gallop and no friction rub.   No murmur heard.  Pulmonary/Chest: Normal expansion, symmetric chest wall expansion and effort normal. No stridor. No respiratory distress. He has decreased breath sounds. He has no wheezes. He has no rhonchi. He has no rales. Chest wall is not dull to percussion. He exhibits no tenderness. Negative for egophony. Negative for tactile fremitus.   Abdominal: Soft. Bowel sounds are normal. He exhibits no distension and no mass. There is no hepatosplenomegaly. There is no abdominal tenderness. There is no rebound and no guarding. No hernia.   Musculoskeletal:         General: No tenderness, deformity or edema. Normal range of motion.      Cervical back: Normal range of motion and neck supple.   Lymphadenopathy: No supraclavicular adenopathy is present.     He has no cervical adenopathy.     He has no axillary  adenopathy.   Neurological: He is alert and oriented to person, place, and time. He displays normal reflexes. No cranial nerve deficit. He exhibits normal muscle tone.   Skin: Skin is warm and dry. No rash noted. He is not diaphoretic. No cyanosis or erythema. No pallor. Nails show no clubbing.   Psychiatric: He has a normal mood and affect. His behavior is normal. Judgment and thought content normal.       Accessory Clinical Data:  Chest x-ray:  Reviewed personally and findings are dictated under HPI    CT scan:  none available    PFTs:  severe end-stage COPD    6MWT:  none available    TTE:    Lab data:    All radiographic imaging of the chest, PFT tracings/data, and 6MWT data have been independently reviewed and interpreted unless otherwise specified.     Assessment and Plan:        Problem List Items Addressed This Visit    None  Visit Diagnoses       End stage COPD    -  Primary    Relevant Medications    budesonide-glycopyr-formoterol (BREZTRI AEROSPHERE) 160-9-4.8 mcg/actuation HFAA    History of tobacco abuse               I reviewed patient's inhaler use technique which was not correct.  We went over the importance of compliance and need for maintenance inhalers in addition to rescue inhalers.  He seems to be convinced to start taking maintenance inhalers.  Given advanced COPD he must be on LABA/LAMA/ICS. Given his underlying psychiatric issues, a single inhaler would be most appropriate to improve compliance.  I am going to give him some Trelegy samples and [facility]     A 6 minute walk test to assess and arrange for supplemental oxygen was highly recommended but patient refused and walked out of the clinic later.    Given his timing of smoking cessation and difficulty with adhering to treatment plans and compliance, I am not going to obtain a low-dose CT scan for lung cancer screening at this point.    Patient must obtain age-appropriate preventative vaccinations from  his primary care provider.    63 minutes of total time was spent the encounter, which includes face-to-face time on history and examination and non face-to-face time preparing to see the patient that includes reviewing the images, labs, PFTs, obtaining and reviewing separately obtained history, documenting clinical information in the electronic health record, independently interpreting results and communicating results to the patient, as well as care coordination.     Follow up in about 6 months (around 1/10/2025).   Thank you very much for involving me in the care of this patient.  Please do not hesitate to reach me if you have any further questions or concerns.    This note is dictated on M*Modal word recognition program.  There are word recognition mistakes that are occasionally missed on review.

## 2024-08-05 RX ORDER — TIOTROPIUM BROMIDE INHALATION SPRAY 3.12 UG/1
2 SPRAY, METERED RESPIRATORY (INHALATION) DAILY
Qty: 4 G | Refills: 6 | Status: SHIPPED | OUTPATIENT
Start: 2024-08-05

## 2024-08-05 RX ORDER — FLUTICASONE PROPIONATE AND SALMETEROL 250; 50 UG/1; UG/1
1 POWDER RESPIRATORY (INHALATION) 2 TIMES DAILY
Qty: 60 EACH | Refills: 6 | Status: SHIPPED | OUTPATIENT
Start: 2024-08-05 | End: 2025-08-05

## 2024-08-06 ENCOUNTER — TELEPHONE (OUTPATIENT)
Dept: PULMONOLOGY | Facility: CLINIC | Age: 64
End: 2024-08-06
Payer: OTHER GOVERNMENT

## 2025-01-27 ENCOUNTER — TELEPHONE (OUTPATIENT)
Dept: HEMATOLOGY/ONCOLOGY | Facility: CLINIC | Age: 65
End: 2025-01-27
Payer: OTHER GOVERNMENT

## 2025-01-27 NOTE — TELEPHONE ENCOUNTER
----- Message from Delia sent at 1/27/2025 12:42 PM CST -----  Contact: self  Patient is requesting a call back stating his appointment was erroneously canceled, is upset. Did advise patient office was out to lunch, he states would like a call back as soon as possible or he would head to the office. Please call back at [phone]

## 2025-01-29 ENCOUNTER — TELEPHONE (OUTPATIENT)
Dept: PULMONOLOGY | Facility: CLINIC | Age: 65
End: 2025-01-29
Payer: OTHER GOVERNMENT

## 2025-02-04 ENCOUNTER — TELEPHONE (OUTPATIENT)
Dept: PULMONOLOGY | Facility: CLINIC | Age: 65
End: 2025-02-04
Payer: OTHER GOVERNMENT

## 2025-02-04 NOTE — TELEPHONE ENCOUNTER
Spoke with pt. And he confirmed his appt. For 2-5-2025 @ 0820        ----- Message from Lin sent at 2/4/2025 11:00 AM CST -----  Contact: self  Type:  Patient Returning Call    Who Called:Mati Kang  Who Left Message for Patient:unsure  Does the patient know what this is regarding?:same day appt  Would the patient rather a call back or a response via [facility]?   Best Call Back Number:[phone]  Additional Information: n/a

## 2025-02-05 ENCOUNTER — CLINICAL SUPPORT (OUTPATIENT)
Dept: PULMONOLOGY | Facility: CLINIC | Age: 65
End: 2025-02-05
Payer: OTHER GOVERNMENT

## 2025-02-05 ENCOUNTER — OFFICE VISIT (OUTPATIENT)
Dept: PULMONOLOGY | Facility: CLINIC | Age: 65
End: 2025-02-05
Payer: OTHER GOVERNMENT

## 2025-02-05 VITALS
RESPIRATION RATE: 20 BRPM | SYSTOLIC BLOOD PRESSURE: 151 MMHG | HEIGHT: 69 IN | OXYGEN SATURATION: 92 % | HEART RATE: 73 BPM | BODY MASS INDEX: 31.25 KG/M2 | WEIGHT: 211 LBS | DIASTOLIC BLOOD PRESSURE: 95 MMHG

## 2025-02-05 VITALS — WEIGHT: 204 LBS | BODY MASS INDEX: 33.99 KG/M2 | HEIGHT: 65 IN

## 2025-02-05 DIAGNOSIS — J44.9 ADVANCED COPD: Primary | ICD-10-CM

## 2025-02-05 DIAGNOSIS — J96.11 CHRONIC HYPOXEMIC RESPIRATORY FAILURE: ICD-10-CM

## 2025-02-05 DIAGNOSIS — F17.211 CIGARETTE NICOTINE DEPENDENCE IN REMISSION: ICD-10-CM

## 2025-02-05 DIAGNOSIS — Z12.2 ENCOUNTER FOR SCREENING FOR LUNG CANCER: ICD-10-CM

## 2025-02-05 DIAGNOSIS — J44.9 CHRONIC OBSTRUCTIVE PULMONARY DISEASE, UNSPECIFIED COPD TYPE: Primary | ICD-10-CM

## 2025-02-05 DIAGNOSIS — J44.9 CHRONIC OBSTRUCTIVE PULMONARY DISEASE, UNSPECIFIED COPD TYPE: ICD-10-CM

## 2025-02-05 PROCEDURE — 99215 OFFICE O/P EST HI 40 MIN: CPT | Mod: S$PBB,25,, | Performed by: INTERNAL MEDICINE

## 2025-02-05 PROCEDURE — 94618 PULMONARY STRESS TESTING: CPT | Mod: 26,S$PBB,, | Performed by: INTERNAL MEDICINE

## 2025-02-05 RX ORDER — IPRATROPIUM BROMIDE AND ALBUTEROL SULFATE 2.5; .5 MG/3ML; MG/3ML
3 SOLUTION RESPIRATORY (INHALATION) EVERY 6 HOURS PRN
Qty: 75 ML | Refills: 5 | Status: SHIPPED | OUTPATIENT
Start: 2025-02-05

## 2025-02-05 RX ORDER — FLUTICASONE PROPIONATE AND SALMETEROL 250; 50 UG/1; UG/1
1 POWDER RESPIRATORY (INHALATION) 2 TIMES DAILY
Qty: 60 EACH | Refills: 6 | Status: SHIPPED | OUTPATIENT
Start: 2025-02-05 | End: 2026-02-05

## 2025-02-05 RX ORDER — TIOTROPIUM BROMIDE INHALATION SPRAY 3.12 UG/1
2 SPRAY, METERED RESPIRATORY (INHALATION) DAILY
Qty: 4 G | Refills: 6 | Status: SHIPPED | OUTPATIENT
Start: 2025-02-05

## 2025-02-05 NOTE — PROCEDURES
"[facility] - Pulmonary Function  Six Minute Walk     SUMMARY     Ordering Provider: Dr. Garcia   Interpreting Provider: Dr. Garcia  Performing nurse/tech/RT: YONATAN Pinon RRT  Diagnosis: COPD  Height: 5' 5" (165.1 cm)  Weight: 92.5 kg (204 lb)  BMI (Calculated): 33.9                Phase Oxygen Assessment Supplemental O2 Heart   Rate Blood Pressure [place] Dyspnea Scale Rating   Resting 91 % Room Air 74 bpm (!) 151/95 3   Exercise        Minute        1 88 % Room Air 96 bpm (placed pt on 2lpm nc)     2 87 % (increased to 3lpm) 2 L/M 98 bpm     3 85 % (increased to 4lpm) 3 L/M 106 bpm     4 85 % (increased to 5lpm) 4 L/M 106 bpm     5 93 % 5 L/M 78 bpm     6  96 % 5 L/M 98 bpm (!) 166/93 4   Recovery        Minute        1 93 % 5 L/M 91 bpm     2 94 %         3 95 % 5 L/M 89 bpm     4 95 % 5 L/M 79 bpm 146/83 3     Six Minute Walk Summary  6MWT Status: completed with stops  Patient Reported: Dyspnea, Other (Comment) (feet pain)     Interpretation:  Did the patient stop or pause?: Yes  How many times did the patient stop or pause?: 1  Stop Time 1: 203  Restart Time 1: 308  Pause Time 1: 105 seconds                             Total Time Walked (Calculated): 255 seconds  Final Partial Lap Distance (feet): 0 feet  Total Distance Meters (Calculated): 121.92 meters  Predicted Distance Meters (Calculated): 451.65 meters  Percentage of Predicted (Calculated): 26.99  Peak VO2 (Calculated): 7.64  Mets: 2.18  Has The Patient Had a Previous Six Minute Walk Test?: No       Previous 6MWT Results  Has The Patient Had a Previous Six Minute Walk Test?: No    "

## 2025-02-05 NOTE — PROGRESS NOTES
Subjective:    Patient Identification:   Patient ID: Mati Kang is a 64 y.o. male.    Referring Provider:  Establ[facility]    Chief Complaint:  Advanced COPD    History of Present Illness:    Mati Kang is a 64 y.o. male who presents for follow-up on his COPD.  Patient mentions that after seeing me in July he was hospitalized about 4 months ago and was treated for COPD exacerbation and discharged home on supplemental oxygen.  [facility] and him oxygen and these are big tanks and he can not carry with them.  Today he is without oxygen in the office.  His oxygen saturation is 92% on rest.  He was supposed to be on Breztri but [facility] had given [facility] Spiriva and Wixela.  He is taking Spiriva twice a day and Wixela once a day.  I have told him to take these the other way around.  He is asking for refills on these as well as his nebulized bronchodilators.  He has not received his flu vaccination.  He has quit smoking couple of months ago when he size psychiatrist.  He is interested in lung cancer screening program.  He mentions that he had a CT chest done 4 months ago at the hospital.  I have reviewed the hospital records from October 2024 when he was admitted for COPD exacerbation and all paresthesia.  This was a CT head not a CT chest.  He was discharged on supplemental oxygen.    Review of Systems:  Review of Systems   Constitutional:  Negative for fever, chills, weight loss, weight gain, activity change, appetite change, fatigue, night sweats and weakness.   HENT:  Negative for nosebleeds, postnasal drip, rhinorrhea, sinus pressure, sore throat, trouble swallowing, voice change, congestion, ear pain and hearing loss.    Eyes:  Negative for redness and itching.   Respiratory:  Positive for cough and dyspnea on extertion. Negative for hemoptysis, sputum production, choking, chest tightness, shortness of breath, wheezing, orthopnea, previous hospitialization due to pulmonary problems, asthma nighttime symptoms,  pleurisy, use of rescue inhaler and Paroxysmal Nocturnal Dyspnea.    Cardiovascular:  Negative for chest pain, palpitations and leg swelling.   Genitourinary:  Negative for difficulty urinating and hematuria.   Endocrine:  Negative for polydipsia, polyphagia, cold intolerance, heat intolerance and polyuria.    Musculoskeletal:  Negative for arthralgias, back pain, gait problem, joint swelling and myalgias.   Skin:  Negative for rash.   Gastrointestinal:  Negative for nausea, vomiting, abdominal pain, abdominal distention and acid reflux.   Neurological:  Negative for dizziness, syncope, weakness, light-headedness and headaches.   Hematological:  Negative for adenopathy. Does not bruise/bleed easily and no excessive bruising.   Psychiatric/Behavioral:  Negative for confusion and sleep disturbance. The patient is not nervous/anxious.          Allergies: Review of patient's allergies indicates:  No Known Allergies    Medications:      Past Medical History:      Past Medical History:   Diagnosis Date    ADD (attention deficit disorder)     Allergy     Anxiety disorder, unspecified     Asthma     Bi inguinal hernia, w obst, w/o gangrene, not spcf as recur     Bipolar disorder, unspecified     COPD (chronic obstructive pulmonary disease)     GERD (gastroesophageal reflux disease)     Hypertension     Insomnia     Low back pain     Male erectile dysfunction, unspecified     Mixed hyperlipidemia     Mood changes     Obesity, unspecified     Prostate cancer     Prurigo nodularis     Shortness of breath     Unspecified glaucoma        Family History:      No family history on file.     Social History:      Past Surgical History:   Procedure Laterality Date    HAND SURGERY Right     fractured fingers    HERNIA REPAIR      PROSTATE SURGERY         Physical Exam:  Vitals:    02/05/25 0833   BP: (!) 151/95   Pulse: 73   Resp: 20     Physical Exam   Constitutional: He is oriented to person, place, and time. He appears not  cachectic. No distress.   HENT:   Head: Normocephalic.   Nose: Nose normal. No mucosal edema. No polyps.   Mouth/Throat: Oropharynx is clear and moist. Normal dentition. No oropharyngeal exudate.   Neck: No JVD present. No tracheal deviation present. No thyromegaly present.   Cardiovascular: Normal rate, regular rhythm, normal heart sounds and intact distal pulses. Exam reveals no gallop and no friction rub.   No murmur heard.  Pulmonary/Chest: Normal expansion, symmetric chest wall expansion and effort normal. No stridor. No respiratory distress. He has decreased breath sounds. He has no wheezes. He has no rhonchi. He has no rales. Chest wall is not dull to percussion. He exhibits no tenderness. Negative for egophony. Negative for tactile fremitus.   Abdominal: Soft. Bowel sounds are normal. He exhibits no distension and no mass. There is no hepatosplenomegaly. There is no abdominal tenderness. There is no rebound and no guarding. No hernia.   Musculoskeletal:         General: No tenderness, deformity or edema. Normal range of motion.      Cervical back: Normal range of motion and neck supple.   Lymphadenopathy: No supraclavicular adenopathy is present.     He has no cervical adenopathy.     He has no axillary adenopathy.   Neurological: He is alert and oriented to person, place, and time. He displays normal reflexes. No cranial nerve deficit. He exhibits normal muscle tone.   Skin: Skin is warm and dry. No rash noted. He is not diaphoretic. No cyanosis or erythema. No pallor. Nails show no clubbing.   Psychiatric: He has a normal mood and affect. His behavior is normal. Judgment and thought content normal.             Accessory Clinical Data:  Chest x-ray:    CT scan:     PFTs:     6MWT:     TTE:    Lab data:    All radiographic imaging of the chest, PFT tracings/data, and 6MWT data have been independently reviewed and interpreted unless otherwise specified.     Assessment and Plan:        Problem List Items  Addressed This Visit    None  Visit Diagnoses       Advanced COPD    -  Primary    Relevant Medications    albuterol-ipratropium (DUO-NEB) 2.5 mg-0.5 mg/3 mL nebulizer solution    fluticasone-salmeterol diskus inhaler 250-50 mcg    tiotropium bromide (SPIRIVA RESPIMAT) 2.5 mcg/actuation inhaler    Chronic hypoxemic respiratory failure        Cigarette nicotine dependence in remission        Relevant Orders    CT Chest Lung Screening Low Dose    Encounter for screening for lung cancer               Orders Placed This Encounter   Procedures    CT Chest Lung Screening Low Dose     Standing Status:   Future     Standing Expiration Date:   2/5/2026     Order Specific Question:   Is there documentation of shared decision making for this lung screening exam?     Answer:   Yes     Order Specific Question:   Is the patient a current smoker?     Answer:   No     Order Specific Question:   How many years has the patient quit smoking?     Answer:   1     Order Specific Question:   Does the patient have a 20-pack/year or greater smoke history?     Answer:   Yes     Order Specific Question:   Is the patient between the ages 50-80 years old?     Answer:   Yes     Order Specific Question:   How many packs per day smoked?     Answer:   1     Order Specific Question:   How many years smoked?     Answer:   38     Order Specific Question:   Does the patient show any signs or symptoms of lung cancer?     Answer:   No     Order Specific Question:   Is this the first (baseline) CT or an annual exam?     Answer:   Baseline [1]     Order Specific Question:   May the Radiologist modify the order per protocol to meet the clinical needs of the patient?     Answer:   Yes     Order Specific Question:   Is this a low dose screening chest CT?     Answer:   Yes      Patient was told how to use the inhalers in the right dosing and frequency.  He was advised to get age-appropriate seasonal preventative vaccinations from his PCP.  He acknowledges the  importance and significance of getting these vaccinations.  He is requesting a portable oxygen reservoir.  We will get a 6 minute walk test today.  We will obtain a low-dose CT scan for lung cancer screening.  We will send renewal script small all his inhalers and nebulizer.  The patient is within the appropriate range, having a 38 pack-year smoking history with smoking cessation last year.  The patient is asymptomatic from a lung cancer standpoint and appears to be an acceptable candidate for curative surgical resection were lung cancer to be diagnosed.  I discussed the rationale for lung cancer screening using low-dose CT scan.  We discussed the risks and benefits including a mortality reduction in lung cancer as well as the potential for false positive results, over diagnosis, and need for interval follow-up CT scan and/or an invasive procedure for diagnosis.  The patient understands that this is not a one-time screening but will need to continue annually until it is determined that screening should be stopped.  The patient wishes to proceed, and an order for low-dose CT was placed.      Follow up in about 6 months (around 8/5/2025) for with Ms. Thomson.     This note is dictated on [employer] word recognition program.  There are word recognition mistakes that are occasionally missed on review.

## 2025-02-24 ENCOUNTER — TELEPHONE (OUTPATIENT)
Dept: PULMONOLOGY | Facility: CLINIC | Age: 65
End: 2025-02-24
Payer: OTHER GOVERNMENT

## 2025-02-24 NOTE — TELEPHONE ENCOUNTER
Patient tells me he needs more nebulizer masks and tubing. He still has his ashly and he has plenty of Duonebs to go into the nebulizer but needs the supplies.  We will send order to [employer] for more supplies. We also left some extra masks/tubing for patient at  if patient needs them more urgently.  Informed patient of above. Patient voiced understanding.

## 2025-02-24 NOTE — TELEPHONE ENCOUNTER
----- Message from OSMANI Ramírez sent at 2/24/2025 12:31 PM CST -----  Contact: SHARI ANTHONY [[id number]]    ----- Message -----  From: Alix Tinoco  Sent: 2/24/2025  10:44 AM CST  To: Radha Graham    .Type:  RX Refill RequestWho Called: SHARI ANTHONY [[id number]]Refill or New Rx:RefillRX Name and Strength:Nebulizer Is this a 30 day or 90 day RX:30Preferred Pharmacy with phone number:.[facility] - [address] [address]: [phone] Fax: [phone] or Mail Order:LocalOrdering Provider:Kala Joe the patient rather a call back or a response via [facility]? [employer] Call Back Number:.[phone] (home) Additional Information: Patient called to request a refill for a specific nebulizer part and/or medication.

## 2025-03-13 ENCOUNTER — TELEPHONE (OUTPATIENT)
Dept: PULMONOLOGY | Facility: CLINIC | Age: 65
End: 2025-03-13
Payer: OTHER GOVERNMENT

## 2025-03-13 NOTE — TELEPHONE ENCOUNTER
"Called to inquire about missing supplies.  Patient stated he needs the face mask and oxygen tubing attachments that are used with the nebulizer. Patient said the order has to be put in with "prosthetics"? As a specialty item.   I offered to leave the supplies needed at the  with the , as well as look into placing the order for the necessary items.  Patient doesn't drive, but he said he would try to get a ride to come pick it up.      ----- Message from Drillster sent at 3/3/2025 10:03 AM CST -----  Contact: Mati  Caller Requesting A Call BackName of Caller: Mati Nature of Call: For his supplies for his nebulizer Call Back Number: [phone] Information: Pt says that he would like to speak to the nurse about the supplies for his nebulizer.  "

## 2025-08-06 ENCOUNTER — TELEPHONE (OUTPATIENT)
Dept: PULMONOLOGY | Facility: CLINIC | Age: 65
End: 2025-08-06

## 2025-08-06 NOTE — TELEPHONE ENCOUNTER
Patient arrived at this clinic, in obvious resp distress, using accessory muscles, pursed lip breathing,slightly cyanotic lips. Patient is in the lobby, and I asked him did he want me to call 911 , and he shook his head yes. EMS called, repot given to dispatcher. EMS arrived and pt was put on a stretcher and was en route to [facility]  Alix Garcia PA-C , Amandeep Pinon RRT, Mariama West LPN were attending to patient, while I was on the phone with EMS Dispatcher.